# Patient Record
Sex: MALE | Race: WHITE | NOT HISPANIC OR LATINO | Employment: OTHER | URBAN - NONMETROPOLITAN AREA
[De-identification: names, ages, dates, MRNs, and addresses within clinical notes are randomized per-mention and may not be internally consistent; named-entity substitution may affect disease eponyms.]

---

## 2023-01-09 VITALS
DIASTOLIC BLOOD PRESSURE: 92 MMHG | SYSTOLIC BLOOD PRESSURE: 152 MMHG | WEIGHT: 200.94 LBS | HEART RATE: 66 BPM | HEIGHT: 71 IN | TEMPERATURE: 97 F | BODY MASS INDEX: 28.13 KG/M2 | RESPIRATION RATE: 16 BRPM

## 2023-01-09 RX ORDER — OXYBUTYNIN CHLORIDE 5 MG/1
5 TABLET ORAL 3 TIMES DAILY
COMMUNITY
End: 2023-12-28

## 2023-01-09 RX ORDER — FAMOTIDINE 20 MG/1
20 TABLET, FILM COATED ORAL 2 TIMES DAILY
COMMUNITY

## 2023-01-09 RX ORDER — CALC/MAG/B COMPLEX/D3/HERB 61
15 TABLET ORAL DAILY
COMMUNITY
End: 2023-07-17

## 2023-01-09 RX ORDER — IMIPRAMINE HYDROCHLORIDE 25 MG/1
25 TABLET, FILM COATED ORAL NIGHTLY
COMMUNITY

## 2023-01-09 RX ORDER — CLONAZEPAM 0.5 MG/1
0.5 TABLET ORAL 2 TIMES DAILY PRN
COMMUNITY
End: 2023-12-28

## 2023-03-02 ENCOUNTER — DOCUMENTATION ONLY (OUTPATIENT)
Dept: PEDIATRICS | Facility: CLINIC | Age: 73
End: 2023-03-02

## 2023-03-02 LAB
ALBUMIN/GLOB SERPL ELPH: 2.2 {RATIO}
ALBUMIN: 4.2
ALKALINE PHOS(POC): 36
ALT SERPL W P-5'-P-CCNC: 31 U/L (ref 10–40)
AST SERPL-CCNC: 27 U/L
BILIRUBIN, TOTAL: 0.8
BUN SERPL-MCNC: 13 MG/DL
BUN/CREAT RATIO: 10
CALCIUM SERPL-MCNC: 9.2 MG/DL
CHLORIDE: 106 MMOL/L
CHOLEST SERPL-MSCNC: 206 MG/DL (ref 0–200)
CO2 SERPL-SCNC: 28 MMOL/L
CREAT SERPL-MCNC: 1.3 MG/DL
DIRECT LDL: 122
ERYTHROCYTE [DISTWIDTH] IN BLOOD BY AUTOMATED COUNT: 12.4 %
GFR MDRD AF AMER: >=60
GFR MDRD NON AF AMER: 55
GLOBULIN: 1.9
GLUCOSE: 100
GRAN #: 3.8 K/UL
GRAN%: 58.2 %
HCT: 42.6
HDLC SERPL-MCNC: 55 MG/DL
HEMOGLOBIN: 14.7
LYMPH #: 2.2 K/UL
LYMPH %: 34.7
MCH RBC QN AUTO: 31.6 PG
MCHC RBC AUTO-ENTMCNC: 34.4 G/DL
MCV RBC AUTO: 91.7 FL
MID%: 7.1
MID: 0.5
MPC BLD CALC-MCNC: 8.9 FL
PLATELETS: 155
POTASSIUM: 4.5 MMOL/L
RBC # BLD AUTO: 4.64 10*6/UL
SODIUM: 143 MMOL/L
TOTAL PROTEIN: 6.1 G/DL (ref 6.4–8.2)
TRIGL SERPL-MCNC: 147 MG/DL
WBC: 6.5

## 2023-06-12 ENCOUNTER — TELEPHONE (OUTPATIENT)
Dept: FAMILY MEDICINE | Facility: CLINIC | Age: 73
End: 2023-06-12

## 2023-07-17 RX ORDER — PANTOPRAZOLE SODIUM 40 MG/1
TABLET, DELAYED RELEASE ORAL
Qty: 30 TABLET | Refills: 3 | Status: SHIPPED | OUTPATIENT
Start: 2023-07-17 | End: 2023-11-13

## 2023-11-06 ENCOUNTER — TELEPHONE (OUTPATIENT)
Dept: FAMILY MEDICINE | Facility: CLINIC | Age: 73
End: 2023-11-06
Payer: MEDICARE

## 2023-11-06 DIAGNOSIS — L60.0 INGROWN NAIL: Primary | ICD-10-CM

## 2023-11-06 RX ORDER — CLINDAMYCIN HYDROCHLORIDE 300 MG/1
300 CAPSULE ORAL 3 TIMES DAILY
Qty: 30 CAPSULE | Refills: 0 | Status: SHIPPED | OUTPATIENT
Start: 2023-11-06 | End: 2023-11-16

## 2023-11-06 NOTE — TELEPHONE ENCOUNTER
----- Message from DEYSI Fernandez sent at 11/6/2023  4:26 PM CST -----  Regarding: RE: nurse call  Please let him know meds sent to pharmacy.  ----- Message -----  From: Blank Johnson LPN  Sent: 11/6/2023  10:36 AM CST  To: DEYSI Fernandez  Subject: FW: nurse call                                     ----- Message -----  From: Pao Sanchez  Sent: 11/6/2023   9:20 AM CST  To: Flip Navarro Staff  Subject: nurse call                                       Pt called, states he has infection on finger that he had before and wants nurse to look at it bc we did this for him before and to call in Abx for him, I offered pt soonest available Wednesday, pt denied bc he wont be in town and he just wants nurse to look at it   [phone]  [place]

## 2023-11-13 RX ORDER — PANTOPRAZOLE SODIUM 40 MG/1
TABLET, DELAYED RELEASE ORAL
Qty: 30 TABLET | Refills: 3 | Status: SHIPPED | OUTPATIENT
Start: 2023-11-13 | End: 2024-03-12

## 2023-11-28 ENCOUNTER — DOCUMENTATION ONLY (OUTPATIENT)
Dept: PEDIATRICS | Facility: CLINIC | Age: 73
End: 2023-11-28
Payer: MEDICARE

## 2023-12-14 ENCOUNTER — TELEPHONE (OUTPATIENT)
Dept: FAMILY MEDICINE | Facility: CLINIC | Age: 73
End: 2023-12-14
Payer: MEDICARE

## 2023-12-14 DIAGNOSIS — Z00.00 WELLNESS EXAMINATION: ICD-10-CM

## 2023-12-14 NOTE — TELEPHONE ENCOUNTER
----- Message from Pao Sanchez sent at 12/13/2023 11:25 AM CST -----  Regarding: labs  Wife wants lab orders sent to EM before appt here on 12/28/23   [phone]

## 2023-12-20 ENCOUNTER — DOCUMENTATION ONLY (OUTPATIENT)
Dept: FAMILY MEDICINE | Facility: CLINIC | Age: 73
End: 2023-12-20
Payer: MEDICARE

## 2023-12-20 NOTE — PROGRESS NOTES
All labs within acceptable ranges for age and conditions. No change in treatment needed at this time. Keep appts as scheduled.

## 2023-12-28 ENCOUNTER — OFFICE VISIT (OUTPATIENT)
Dept: FAMILY MEDICINE | Facility: CLINIC | Age: 73
End: 2023-12-28
Payer: MEDICARE

## 2023-12-28 ENCOUNTER — DOCUMENTATION ONLY (OUTPATIENT)
Dept: FAMILY MEDICINE | Facility: CLINIC | Age: 73
End: 2023-12-28

## 2023-12-28 VITALS
HEART RATE: 60 BPM | RESPIRATION RATE: 16 BRPM | DIASTOLIC BLOOD PRESSURE: 80 MMHG | SYSTOLIC BLOOD PRESSURE: 128 MMHG | WEIGHT: 200.19 LBS | TEMPERATURE: 98 F | OXYGEN SATURATION: 99 % | BODY MASS INDEX: 28.66 KG/M2 | HEIGHT: 70 IN

## 2023-12-28 DIAGNOSIS — R73.01 IMPAIRED FASTING GLUCOSE: ICD-10-CM

## 2023-12-28 DIAGNOSIS — Z23 IMMUNIZATION DUE: ICD-10-CM

## 2023-12-28 DIAGNOSIS — I10 HYPERTENSION, UNSPECIFIED TYPE: ICD-10-CM

## 2023-12-28 DIAGNOSIS — E78.5 HYPERLIPIDEMIA, UNSPECIFIED HYPERLIPIDEMIA TYPE: ICD-10-CM

## 2023-12-28 DIAGNOSIS — Z00.00 WELLNESS EXAMINATION: Primary | ICD-10-CM

## 2023-12-28 LAB
ALBUMIN/GLOB SERPL ELPH: 2.5 {RATIO}
ALBUMIN: 4.3
ALKALINE PHOS(POC): 34
ALT: 26
AST: 25
BILIRUBIN, TOTAL: 0.7
BUN SERPL-MCNC: 16 MG/DL
BUN/CREAT RATIO: 13.3
CALCIUM SERPL-MCNC: 9.4 MG/DL
CHLORIDE: 104 MMOL/L
CHOLEST SERPL-MSCNC: 208 MG/DL (ref 0–200)
CO2 SERPL-SCNC: 30 MMOL/L
CREAT SERPL-MCNC: 1.2 MG/DL
ERYTHROCYTE [DISTWIDTH] IN BLOOD BY AUTOMATED COUNT: 12.6 %
GFR MDRD AF AMER: >=60
GFR MDRD NON AF AMER: 60
GLOBULIN: 1.7
GLUCOSE BLOOD: 101
GRAN #: 3.4 K/UL
GRAN%: 65.6 %
HBA1C MFR BLD: 5.4 %
HCT: 41
HDLC SERPL-MCNC: 60 MG/DL
HGB: 14
LDLC SERPL CALC-MCNC: 128 MG/DL
LYMPH #: 1.4 K/UL
LYMPH %: 28.1
MCH RBC QN AUTO: 31.8 PG
MCHC RBC AUTO-ENTMCNC: 34.2 G/DL
MCV RBC AUTO: 93.1 FL
MID%: 6.3
MID: 0.4
MPC BLD CALC-MCNC: 8.9 FL
PLATELETS: 155
POTASSIUM: 4.8 MMOL/L
RBC # BLD AUTO: 4.41 10*6/UL
SODIUM: 141 MMOL/L
TOTAL PROTEIN: 6 G/DL (ref 6.4–8.2)
TRIGL SERPL-MCNC: 102 MG/DL
WBC: 5.2

## 2023-12-28 PROCEDURE — G0009 PNEUMOCOCCAL CONJUGATE VACCINE 20-VALENT: ICD-10-PCS | Mod: ,,, | Performed by: NURSE PRACTITIONER

## 2023-12-28 PROCEDURE — 90694 FLU VACCINE - QUADRIVALENT - ADJUVANTED: ICD-10-PCS | Mod: ,,, | Performed by: NURSE PRACTITIONER

## 2023-12-28 PROCEDURE — 90694 VACC AIIV4 NO PRSRV 0.5ML IM: CPT | Mod: ,,, | Performed by: NURSE PRACTITIONER

## 2023-12-28 PROCEDURE — G0009 ADMIN PNEUMOCOCCAL VACCINE: HCPCS | Mod: ,,, | Performed by: NURSE PRACTITIONER

## 2023-12-28 PROCEDURE — 80061 LIPID PANEL: CPT | Mod: QW,,, | Performed by: NURSE PRACTITIONER

## 2023-12-28 PROCEDURE — 90677 PCV20 VACCINE IM: CPT | Mod: ,,, | Performed by: NURSE PRACTITIONER

## 2023-12-28 PROCEDURE — G0008 FLU VACCINE - QUADRIVALENT - ADJUVANTED: ICD-10-PCS | Mod: ,,, | Performed by: NURSE PRACTITIONER

## 2023-12-28 PROCEDURE — 90677 PNEUMOCOCCAL CONJUGATE VACCINE 20-VALENT: ICD-10-PCS | Mod: ,,, | Performed by: NURSE PRACTITIONER

## 2023-12-28 PROCEDURE — 80061 LIPID PANEL: ICD-10-PCS | Mod: QW,,, | Performed by: NURSE PRACTITIONER

## 2023-12-28 PROCEDURE — G0439 PR MEDICARE ANNUAL WELLNESS SUBSEQUENT VISIT: ICD-10-PCS | Mod: ,,, | Performed by: NURSE PRACTITIONER

## 2023-12-28 PROCEDURE — 80053 COMPREHENSIVE METABOLIC PANEL: ICD-10-PCS | Mod: QW,,, | Performed by: NURSE PRACTITIONER

## 2023-12-28 PROCEDURE — G0439 PPPS, SUBSEQ VISIT: HCPCS | Mod: ,,, | Performed by: NURSE PRACTITIONER

## 2023-12-28 PROCEDURE — 83036 HEMOGLOBIN A1C: ICD-10-PCS | Mod: QW,,, | Performed by: NURSE PRACTITIONER

## 2023-12-28 PROCEDURE — 83036 HEMOGLOBIN GLYCOSYLATED A1C: CPT | Mod: QW,,, | Performed by: NURSE PRACTITIONER

## 2023-12-28 PROCEDURE — 80053 COMPREHEN METABOLIC PANEL: CPT | Mod: QW,,, | Performed by: NURSE PRACTITIONER

## 2023-12-28 PROCEDURE — G0008 ADMIN INFLUENZA VIRUS VAC: HCPCS | Mod: ,,, | Performed by: NURSE PRACTITIONER

## 2023-12-28 RX ORDER — TAMSULOSIN HYDROCHLORIDE 0.4 MG/1
1 CAPSULE ORAL DAILY
COMMUNITY
Start: 2023-12-16

## 2023-12-28 NOTE — PROGRESS NOTES
Patient ID: Reilly Flores  : 1950    Chief Complaint: Annual Exam (Pt here today for wellness.)      History of Present Illness:  The patient is a 73 y.o. White male who presents to clinic for annual wellness visit.    Reviewed and discussed labs. Overall he feels well, no concerns today.     Allergies: Patient is allergic to sulfa (sulfonamide antibiotics).     Past Medical History:  has a past medical history of Anxiety and GERD (gastroesophageal reflux disease).    Surgical History:  has a past surgical history that includes back procedure (N/A); Cataract extraction (N/A); Cyst Removal (N/A); and teeth implants (N/A).    Family History: family history includes Anxiety disorder in his mother; Heart disease in his father.    Social History:  reports that he has never smoked. He has never been exposed to tobacco smoke. He has never used smokeless tobacco. He reports that he does not currently use alcohol. He reports that he does not use drugs.    Care Team: Patient Care Team:  Melanie Castelan APRN as PCP - General (Family Medicine)     Current Medications:  Current Outpatient Medications   Medication Instructions    famotidine (PEPCID) 20 mg, Oral, 2 times daily    imipramine (TOFRANIL) 25 mg, Oral, Nightly    pantoprazole (PROTONIX) 40 MG tablet TAKE 1 TABLET BY MOUTH DAILY    tamsulosin (FLOMAX) 0.4 mg Cap 1 capsule, Oral, Daily       Opioid Screening: Patient medication list reviewed, patient is not taking prescription opioids. Patient is not using additional opioids than prescribed. Patient is at low risk of substance abuse based on this opioid use history.     Patient Reported Health Risk Assessment  What [age] your [age] 70-79  Are you male or female?: Male  During the past four weeks, how much have you been bothered by emotional problems such as feeling anxious, depressed, irritable, sad, or downhearted and blue?: Not at all  During the past five weeks, has your physical and/or emotional health  limited your social activities with family, friends, neighbors, or groups?: Not at all  During the past four weeks, how much bodily pain have you generally had?: No pain  During the past four weeks, was someone available to help if you needed and wanted help?: Yes, as much as I wanted  During the past four weeks, what was the hardest physical activity you could do for at least two minutes?: Heavy  Can you get to places out of walking distance without help?  (For example, can you travel alone on buses or taxis, or drive your own car?): Yes  Can you go shopping for groceries or clothes without someone's help?: Yes  Can you prepare your own meals?: Yes  Can you do your own housework without help?: Yes  Because of any health problems, do you need the help of another person with your personal care needs such as eating, bathing, dressing, or getting around the house?: No  Can you handle your own money without help?: Yes  During the past four weeks, how would you rate your health in general?: Very good  How have things been going for you during the past four weeks?: Very well  Are you having difficulties driving your car?: No  Do you always fasten your seat belt when you are in a car?: Yes, usually  How often in the past four weeks have you been bothered by falling or dizzy when standing up?: Never  How often in the past four weeks have you been bothered by sexual problems?: Sometimes  How often in the past four weeks have you been bothered by trouble eating well?: Never  How often in the past four weeks have you been bothered by teeth or denture problems?: Never  How often in the past four weeks have you been bothered with problems using the telephone?: Never  How often in the past four weeks have you been bothered by tiredness or fatigue?: Never  Have you fallen two or more times in the past year?: No  Are you afraid of falling?: No  Are you a smoker?: No  During the past four weeks, how many drinks of wine, beer, or  "other alcoholic beverages did you have?: No alcohol at all  Do you exercise for about 20 minutes three or more days a week?: Yes, most of the time  Have you been given any information to help you with hazards in your house that might hurt you?: No  Have you been given any information to help you with keeping track of your medications?: No  How often do you have trouble taking medicines the way you've been told to take them?: I always take them as prescribed  How confident are you that you can control and manage most of your health problems?: Very confident  What is your race? (Check all that apply.):     Review of Systems     Twelve point review of system conducted, negative except as stated in the history of present illness.  See HPI for details.    Visit Vitals  /80 (BP Location: Left arm)   Pulse 60   Temp 97.8 °F (36.6 °C) (Oral)   Resp 16   Ht 5' 10" (1.778 m)   Wt 90.8 kg (200 lb 3.2 oz)   SpO2 99%   BMI 28.73 kg/m²       Physical Exam  Constitutional:       Appearance: Normal appearance. He is normal weight.   HENT:      Head: Normocephalic.      Right Ear: Tympanic membrane, ear canal and external ear normal.      Left Ear: Tympanic membrane, ear canal and external ear normal.      Nose: Nose normal.      Mouth/Throat:      Mouth: Mucous membranes are moist.      Pharynx: Oropharynx is clear.   Eyes:      Extraocular Movements: Extraocular movements intact.      Conjunctiva/sclera: Conjunctivae normal.      Pupils: Pupils are equal, round, and reactive to light.   Cardiovascular:      Rate and Rhythm: Normal rate and regular rhythm.      Pulses: Normal pulses.      Heart sounds: Normal heart sounds.   Pulmonary:      Effort: Pulmonary effort is normal.      Breath sounds: Normal breath sounds.   Abdominal:      General: Abdomen is flat. Bowel sounds are normal.      Palpations: Abdomen is soft.   Musculoskeletal:      Cervical back: Neck supple.   Skin:     General: Skin is warm and dry. "   Neurological:      General: No focal deficit present.      Mental Status: He is alert and oriented to person, place, and time.   Psychiatric:         Behavior: Behavior normal.               No data to display                  12/28/2023     9:30 AM   Fall Risk Assessment - Outpatient   Mobility Status Ambulatory   Number of falls 0   Identified as fall risk False           Depression Screening  Over the past two weeks, has the patient felt down, depressed, or hopeless?: No  Over the past two weeks, has the patient felt little interest or pleasure in doing things?: No  Functional Ability/Safety Screening  Was the patient's timed Up & Go test unsteady or longer than 30 seconds?: No  Does the patient need help with phone, transportation, shopping, preparing meals, housework, laundry, meds, or managing money?: No  Does the patient's home have rugs in the hallway, lack grab bars in the bathroom, lack handrails on the stairs or have poor lighting?: No  Have you noticed any hearing difficulties?: Yes  Cognitive Function (Assessed through direct observation with due consideration of information obtained by way of patient reports and/or concerns raised by family, friends, caretakers, or others)    Does the patient repeat questions/statements in the same day?: No  Does the patient have trouble remembering the date, year, and time?: No  Does the patient have difficulty managing finances?: No  Does the patient have a decreased sense of direction?: No    Labs Reviewed:  Chemistry:  Lab Results   Component Value Date     12/20/2023    K 4.8 12/20/2023    BUN 16 12/20/2023    CREATININE 1.2 12/20/2023    GLUCOSE 101 12/20/2023    CALCIUM 9.4 12/20/2023    ALBUMIN 4.2 02/01/2022    AST 27 02/01/2022    ALT 26 12/20/2023        Lab Results   Component Value Date    HGBA1C 5.4 12/20/2023        Hematology:  Lab Results   Component Value Date    WBC 5.2 12/20/2023    HGB 14.0 12/20/2023    MCV 93.1 12/20/2023    MPV 8.9  12/20/2023    GRAN 3.4 12/20/2023    GRAN 65.6 12/20/2023       Lipid Panel:  Lab Results   Component Value Date    CHOL 208 (A) 12/20/2023    HDL 60 12/20/2023    DLDL 122 02/01/2022    TRIG 102 12/20/2023        Assessment & Plan:  1. Wellness examination   Age appropriate preventative screenings reviewed.  -     (In Office Administered) Pneumococcal Conjugate Vaccine (20 Valent) (IM) (Preferred)  -     Influenza (FLUAD) - Quadrivalent (Adjuvanted) *Preferred* (65+) (PF)      Colon Cancer Screening -  2/3/22- Cologuard screen negative  Eye Exam- Recommend annually.  Dental Exam- Recommend biannually.    Vaccinations:  Immunization History   Administered Date(s) Administered    COVID-19 Vaccine 02/11/2021, 03/11/2021, 12/16/2021    Influenza 01/21/2019, 10/02/2019    Influenza (FLUAD) - Quadrivalent - Adjuvanted - PF *Preferred* (65+) 12/28/2023    Influenza - Quadrivalent 12/14/2022    Influenza - Quadrivalent - MDCK - PF 01/21/2019    Influenza - Quadrivalent - PF *Preferred* (6 months and older) 10/02/2019    Pneumococcal Conjugate - 20 Valent 12/28/2023       Future Appointments   Date Time Provider Department Center   12/26/2024 10:00 AM Melanie Castelan, APRN [facility] [place] [employer] Annual Wellness and Personalized Prevention Plan:   Fall Risk + Home Safety + Hearing Impairment + Depression Screen + Opioid and Substance Abuse Screening + Cognitive Impairment Screen + Health Risk Assessment all reviewed.     Health Maintenance Topics with due status: Not Due       Topic Last Completion Date    Colorectal Cancer Screening 02/03/2022    Hemoglobin A1c (Prediabetes) 12/20/2023    Lipid Panel 12/20/2023            Advance Care Planning   I attest to discussing Advance Care Planning with patient and/or family member.  Education was provided including the importance of the Health Care Power of , Advance Directives, and/or [place] documentation.  The patient expressed understanding to the  importance of this information and discussion.       Follow up in about 1 year (around 12/28/2024) for Wellness. In addition to their scheduled follow up, the patient has also been instructed to follow up on as needed basis.         Melanie Castelan, APRN

## 2024-02-19 ENCOUNTER — OFFICE VISIT (OUTPATIENT)
Dept: FAMILY MEDICINE | Facility: CLINIC | Age: 74
End: 2024-02-19
Payer: MEDICARE

## 2024-02-19 VITALS
BODY MASS INDEX: 29.03 KG/M2 | DIASTOLIC BLOOD PRESSURE: 80 MMHG | WEIGHT: 202.81 LBS | SYSTOLIC BLOOD PRESSURE: 144 MMHG | RESPIRATION RATE: 16 BRPM | TEMPERATURE: 98 F | HEART RATE: 74 BPM | HEIGHT: 70 IN | OXYGEN SATURATION: 99 %

## 2024-02-19 DIAGNOSIS — H61.21 IMPACTED CERUMEN OF RIGHT EAR: Primary | ICD-10-CM

## 2024-02-19 PROCEDURE — 69210 REMOVE IMPACTED EAR WAX UNI: CPT | Mod: ,,, | Performed by: NURSE PRACTITIONER

## 2024-02-19 PROCEDURE — 99214 OFFICE O/P EST MOD 30 MIN: CPT | Mod: ,,, | Performed by: NURSE PRACTITIONER

## 2024-02-19 RX ORDER — OFLOXACIN 3 MG/ML
5 SOLUTION AURICULAR (OTIC) 2 TIMES DAILY
Qty: 5 ML | Refills: 0 | Status: SHIPPED | OUTPATIENT
Start: 2024-02-19 | End: 2024-02-22

## 2024-02-19 NOTE — PROGRESS NOTES
Subjective:       Patient ID: Reilly Flores is a 73 y.o. male.    Chief Complaint: ear congestion (Pt here today c/o ear congestion and would like ear cleaned. )      HPI  Patient reports using debrox at home for about one week without improvement.    Review of Systems      Twelve point review of system conducted, negative except as stated in the history of present illness.  See HPI for details.      Objective:      Vitals:    02/19/24 1436   BP: (!) 144/80   Pulse: 74   Resp: 16   Temp: 97.8 °F (36.6 °C)     Review of patient's allergies indicates:   Allergen Reactions    Sulfa (sulfonamide antibiotics)      Past Medical History:   Diagnosis Date    Anxiety     GERD (gastroesophageal reflux disease)        Physical Exam  Vitals and nursing note reviewed.   Constitutional:       Appearance: Normal appearance.   HENT:      Head: Normocephalic.      Right Ear: There is impacted cerumen.      Left Ear: Tympanic membrane normal.   Eyes:      Conjunctiva/sclera: Conjunctivae normal.      Pupils: Pupils are equal, round, and reactive to light.   Cardiovascular:      Rate and Rhythm: Normal rate and regular rhythm.   Pulmonary:      Effort: Pulmonary effort is normal.      Breath sounds: Normal breath sounds.   Musculoskeletal:         General: Normal range of motion.      Cervical back: Normal range of motion and neck supple.   Skin:     General: Skin is warm and dry.   Neurological:      General: No focal deficit present.      Mental Status: He is alert and oriented to person, place, and time.   Psychiatric:         Mood and Affect: Mood normal.         Behavior: Behavior normal.            Assessment/Plan:       1. Impacted cerumen of right ear  -     ofloxacin (FLOXIN) 0.3 % otic solution; Place 5 drops into both ears 2 (two) times daily. for 3 days  Dispense: 5 mL; Refill: 0  -     Ear Cerumen Removal   See quick procedure note.         Follow-up:       Follow up if symptoms worsen or fail to improve.                      [facility]  [place]

## 2024-03-12 RX ORDER — PANTOPRAZOLE SODIUM 40 MG/1
TABLET, DELAYED RELEASE ORAL
Qty: 30 TABLET | Refills: 3 | Status: SHIPPED | OUTPATIENT
Start: 2024-03-12

## 2024-05-29 ENCOUNTER — DOCUMENTATION ONLY (OUTPATIENT)
Dept: FAMILY MEDICINE | Facility: CLINIC | Age: 74
End: 2024-05-29
Payer: MEDICARE

## 2024-08-05 RX ORDER — PANTOPRAZOLE SODIUM 40 MG/1
40 TABLET, DELAYED RELEASE ORAL
Qty: 30 TABLET | Refills: 5 | Status: SHIPPED | OUTPATIENT
Start: 2024-08-05

## 2024-11-01 ENCOUNTER — DOCUMENTATION ONLY (OUTPATIENT)
Dept: FAMILY MEDICINE | Facility: CLINIC | Age: 74
End: 2024-11-01
Payer: MEDICARE

## 2024-11-01 LAB — CRC RECOMMENDATION EXT: NORMAL

## 2024-11-07 ENCOUNTER — DOCUMENTATION ONLY (OUTPATIENT)
Dept: FAMILY MEDICINE | Facility: CLINIC | Age: 74
End: 2024-11-07
Payer: MEDICARE

## 2024-11-07 LAB — CRC RECOMMENDATION EXT: NORMAL

## 2025-01-07 ENCOUNTER — OFFICE VISIT (OUTPATIENT)
Dept: FAMILY MEDICINE | Facility: CLINIC | Age: 75
End: 2025-01-07
Payer: MEDICARE

## 2025-01-07 VITALS
BODY MASS INDEX: 28.79 KG/M2 | HEART RATE: 66 BPM | HEIGHT: 70 IN | WEIGHT: 201.13 LBS | DIASTOLIC BLOOD PRESSURE: 88 MMHG | OXYGEN SATURATION: 99 % | SYSTOLIC BLOOD PRESSURE: 130 MMHG | TEMPERATURE: 98 F

## 2025-01-07 DIAGNOSIS — Z12.5 SCREENING FOR PROSTATE CANCER: ICD-10-CM

## 2025-01-07 DIAGNOSIS — Z00.00 ENCOUNTER FOR PREVENTIVE HEALTH EXAMINATION: Primary | ICD-10-CM

## 2025-01-07 DIAGNOSIS — F41.9 ANXIETY: ICD-10-CM

## 2025-01-07 DIAGNOSIS — R73.01 IFG (IMPAIRED FASTING GLUCOSE): ICD-10-CM

## 2025-01-07 DIAGNOSIS — Z23 FLU VACCINE NEED: ICD-10-CM

## 2025-01-07 DIAGNOSIS — Z13.220 LIPID SCREENING: ICD-10-CM

## 2025-01-07 PROCEDURE — G0008 ADMIN INFLUENZA VIRUS VAC: HCPCS | Mod: ,,, | Performed by: NURSE PRACTITIONER

## 2025-01-07 PROCEDURE — 90653 IIV ADJUVANT VACCINE IM: CPT | Mod: ,,, | Performed by: NURSE PRACTITIONER

## 2025-01-07 PROCEDURE — G0439 PPPS, SUBSEQ VISIT: HCPCS | Mod: ,,, | Performed by: NURSE PRACTITIONER

## 2025-01-07 RX ORDER — ACETAMINOPHEN 500 MG
1000 TABLET ORAL NIGHTLY
COMMUNITY

## 2025-01-07 RX ORDER — SIMETHICONE 80 MG
80 TABLET,CHEWABLE ORAL NIGHTLY
COMMUNITY

## 2025-01-07 RX ORDER — CHOLECALCIFEROL (VITAMIN D3) 125 MCG
CAPSULE ORAL
COMMUNITY

## 2025-01-07 RX ORDER — MAGNESIUM HYDROXIDE 400 MG/5ML
SUSPENSION, ORAL (FINAL DOSE FORM) ORAL NIGHTLY
COMMUNITY

## 2025-01-07 RX ORDER — SENNOSIDES 8.6 MG/1
TABLET ORAL
COMMUNITY

## 2025-01-07 RX ORDER — SPIRONOLACTONE 25 MG
20 TABLET ORAL NIGHTLY
COMMUNITY

## 2025-01-07 RX ORDER — LANOLIN ALCOHOL/MO/W.PET/CERES
1 CREAM (GRAM) TOPICAL NIGHTLY
COMMUNITY

## 2025-01-07 NOTE — PATIENT INSTRUCTIONS
Medicare Annual Wellness Visit      Patient Name: Reilly Flores  Today's Date: 1/7/2025    Below you will find your 5-10 year Screening Plan Recommendations:  Health Maintenance       Date Due Completion Date    Hepatitis C Screening Never done ---    TETANUS VACCINE Never done ---    Shingles Vaccine (1 of 2) Never done ---    COVID-19 Vaccine (4 - 2024-25 season) 09/01/2024 12/16/2021    RSV Vaccine (Age 60+ and Pregnant patients) (1 - 1-dose 75+ series) 05/05/2026 ---    Colorectal Cancer Screening 11/01/2027 11/1/2024    Lipid Panel 05/31/2029 5/31/2024          Below is your summarized Personalized Prevention Plan that addresses any concerns we discussed today at your visit. Please see attached detailed information specific to your Health Concerns.  Orders Placed This Encounter   Procedures    CBC Auto Differential    Comprehensive Metabolic Panel    Lipid Panel    Hemoglobin A1C    PSA, Screening           The following information is provided to all patients.  This information is to help you find additional resources for any problems that may be affecting your health: Living healthy guide: www.[facility].[place].gov      Understanding Diabetes: www.diabetes.org      Eating healthy: www.cdc.gov/healthyweight      CDC home safety checklist: www.cdc.gov/steadi/patient.html      Agency on Aging: www.goea.[place].gov      Alcoholics anonymous (AA): www.aa.org      Physical Activity: www.rudy.nih.gov/dq3pemx      Tobacco use: www.quitwithusla.org

## 2025-01-07 NOTE — PROGRESS NOTES
Patient ID: Reilly Flores  : 1950    Chief Complaint: Medicare AWV      History of Present Illness:  The patient is a 74 y.o. White male who presents to clinic for annual wellness visit.        Allergies: Patient is allergic to sulfa (sulfonamide antibiotics).     Past Medical History:  has a past medical history of Anxiety and GERD (gastroesophageal reflux disease).    Surgical History:  has a past surgical history that includes back procedure (N/A); Cataract extraction (N/A); Cyst Removal (N/A); and teeth implants (N/A).    Family History: family history includes Anxiety disorder in his mother; Heart disease in his father.    Social History:  reports that he has never smoked. He has never been exposed to tobacco smoke. He has never used smokeless tobacco. He reports that he does not currently use alcohol. He reports that he does not use drugs.    Care Team: Patient Care Team:  Melanie Castelan APRN as PCP - General (Family Medicine)     Current Medications:  Current Outpatient Medications   Medication Instructions    acetaminophen (TYLENOL EXTRA STRENGTH) 1,000 mg, Nightly    calcium carbonate/vitamin D3 (VITAMIN D-3 ORAL) 125 mcg, 2 times daily    coQ10 (ubiquinol) 200 mg, Daily    famotidine (PEPCID) 20 mg, 2 times daily    ferrous sulfate (FEOSOL) Tab tablet 1 tablet, Nightly    fish oil/borage/flax/om3,6,9 1 (OMEGA 3-6-9 ORAL) 1,400 mg, Daily    imipramine (TOFRANIL) 25 mg, Nightly    Lactobac no.41/Bifidobact no.7 (PROBIOTIC-10 ORAL) 220 mg, 2 times daily    lutein 20 mg, Nightly    multivitamin-Ca-iron-minerals 27-0.4 mg Tab 2 times daily    naproxen sodium (ALEVE) 220 mg Cap Take 2 capsules every day by oral route.    pantoprazole (PROTONIX) 40 mg, Oral    potassium gluconate 595 mg (99 mg) Tab Nightly    PSYLLIUM HUSK, ASPARTAME, ORAL 1,000 mg, 2 times daily    senna (SENOKOT) 8.6 mg tablet Take 1 tablet every day by oral route in the evening.    simethicone (MYLICON) 80 mg, Nightly     tamsulosin (FLOMAX) 0.4 mg Cap 1 capsule, Daily       Opioid Screening: Patient medication list reviewed, patient is not taking prescription opioids. Patient is not using additional opioids than prescribed. Patient is at low risk of substance abuse based on this opioid use history.     Patient Reported Health Risk Assessment  What [age] 70-79  Are you male or female?: Male  During the past four weeks, how much have you been bothered by emotional problems such as feeling anxious, depressed, irritable, sad, or downhearted and blue?: Not at all  During the past five weeks, has your physical and/or emotional health limited your social activities with family, friends, neighbors, or groups?: Not at all  During the past four weeks, how much bodily pain have you generally had?: Very mild pain  During the past four weeks, was someone available to help if you needed and wanted help?: Yes, as much as I wanted  During the past four weeks, what was the hardest physical activity you could do for at least two minutes?: Heavy  Can you get to places out of walking distance without help?  (For example, can you travel alone on buses or taxis, or drive your own car?): Yes  Can you go shopping for groceries or clothes without someone's help?: Yes  Can you prepare your own meals?: Yes  Can you do your own housework without help?: Yes  Because of any health problems, do you need the help of another person with your personal care needs such as eating, bathing, dressing, or getting around the house?: No  Can you handle your own money without help?: Yes  During the past four weeks, how would you rate your health in general?: Excellent  How have things been going for you during the past four weeks?: Very well  Are you having difficulties driving your car?: No  Do you always fasten your seat belt when you are in a car?: Yes, usually  How often in the past four weeks have you been bothered by falling or dizzy when standing up?:  "Never  How often in the past four weeks have you been bothered by sexual problems?: Seldom  How often in the past four weeks have you been bothered by trouble eating well?: Never  How often in the past four weeks have you been bothered by teeth or denture problems?: Never  How often in the past four weeks have you been bothered with problems using the telephone?: Never  How often in the past four weeks have you been bothered by tiredness or fatigue?: Never  Have you fallen two or more times in the past year?: No  Are you afraid of falling?: No  Are you a smoker?: No  During the past four weeks, how many drinks of wine, beer, or other alcoholic beverages did you have?: No alcohol at all  Do you exercise for about 20 minutes three or more days a week?: Yes, most of the time  Have you been given any information to help you with hazards in your house that might hurt you?: No  Have you been given any information to help you with keeping track of your medications?: No  How often do you have trouble taking medicines the way you've been told to take them?: I always take them as prescribed  How confident are you that you can control and manage most of your health problems?: Very confident  What is your race? (Check all that apply.):     Review of Systems   Twelve point review of system conducted, negative except as stated in the history of present illness.  See HPI for details.    Visit Vitals  /88 (BP Location: Right arm, Patient Position: Sitting)   Pulse 66   Temp 97.7 °F (36.5 °C) (Oral)   Ht 5' 10" (1.778 m)   Wt 91.2 kg (201 lb 2 oz)   SpO2 99%   BMI 28.86 kg/m²       Physical Exam  Constitutional:       Appearance: Normal appearance. He is normal weight.   HENT:      Head: Normocephalic.      Right Ear: External ear normal.      Left Ear: External ear normal.      Nose: Nose normal.      Mouth/Throat:      Mouth: Mucous membranes are moist.      Pharynx: Oropharynx is clear.   Eyes:      Extraocular " Movements: Extraocular movements intact.      Conjunctiva/sclera: Conjunctivae normal.      Pupils: Pupils are equal, round, and reactive to light.   Cardiovascular:      Rate and Rhythm: Normal rate and regular rhythm.      Heart sounds: Normal heart sounds.   Pulmonary:      Effort: Pulmonary effort is normal.      Breath sounds: Normal breath sounds.   Abdominal:      General: Abdomen is flat. Bowel sounds are normal.      Palpations: Abdomen is soft.   Musculoskeletal:         General: Normal range of motion.      Cervical back: Normal range of motion and neck supple.   Skin:     General: Skin is warm and dry.   Neurological:      General: No focal deficit present.      Mental Status: He is alert and oriented to person, place, and time.   Psychiatric:         Mood and Affect: Mood normal.         Behavior: Behavior normal.               No data to display                  1/7/2025    10:00 AM 2/19/2024     2:30 PM 12/28/2023     9:30 AM   Fall Risk Assessment - Outpatient   Mobility Status Ambulatory Ambulatory Ambulatory   Number of falls 0 0 0   Identified as fall risk False False False                Labs Reviewed:  Chemistry:  Lab Results   Component Value Date     12/20/2023    K 4.8 12/20/2023    BUN 16 12/20/2023    CREATININE 1.2 12/20/2023    GLUCOSE 101 12/20/2023    CALCIUM 9.4 12/20/2023    ALBUMIN 4.2 02/01/2022    AST 27 02/01/2022    ALT 26 12/20/2023        Lab Results   Component Value Date    HGBA1C 5.4 12/20/2023        Hematology:  Lab Results   Component Value Date    WBC 5.2 12/20/2023    HGB 14.0 12/20/2023    MCV 93.1 12/20/2023    MPV 8.9 12/20/2023    GRAN 3.4 12/20/2023    GRAN 65.6 12/20/2023       Lipid Panel:  Lab Results   Component Value Date    CHOL 208 (A) 12/20/2023    HDL 60 12/20/2023    DLDL 122 02/01/2022    TRIG 102 12/20/2023        Assessment & Plan:  1. Encounter for preventive health examination  Age appropriate preventative screenings reviewed.    -     CBC Auto  Differential; Future; Expected date: 01/07/2025  -     Comprehensive Metabolic Panel; Future; Expected date: 01/07/2025  -     Hemoglobin A1C; Future; Expected date: 01/07/2025    2. Flu vaccine need  -     influenza (adjuvanted) (Fluad) 45 mcg/0.5 mL IM vaccine (> or = 64 yo) 0.5 mL    3. Anxiety   Managed with Tofranil 25 mg nightly; followed by Psychiatry  -     CBC Auto Differential; Future; Expected date: 01/07/2025    4. IFG (impaired fasting glucose)  -     Hemoglobin A1C; Future; Expected date: 01/07/2025    5. Screening for prostate cancer  -     PSA, Screening; Future; Expected date: 01/07/2025    6. Lipid screening  -     Lipid Panel; Future; Expected date: 01/07/2025         Vaccinations:  Immunization History   Administered Date(s) Administered    COVID-19 Vaccine 02/11/2021, 03/11/2021, 12/16/2021    Influenza 01/21/2019, 10/02/2019    Influenza (FLUAD) - Quadrivalent - Adjuvanted - PF *Preferred* (65+) 12/28/2023    Influenza - Quadrivalent 12/14/2022    Influenza - Quadrivalent - MDCK - PF 01/21/2019    Influenza - Quadrivalent - PF *Preferred* (6 months and older) 10/02/2019    Influenza - Trivalent - Fluad - Adjuvanted - PF (65 years and older 01/07/2025    Pneumococcal Conjugate - 20 Valent 12/28/2023       Future Appointments   Date Time Provider Department Center   1/15/2025  8:10 AM LAB, [facility] LABORATORY DRAW [facility]ATION [facility] [place]       Follow up in about 1 year (around 1/7/2026), or if symptoms worsen or fail to improve, for Medicare AWV. Call sooner if needed.    DEYSI Vazquez    Lab Frequency Next Occurrence   CBC Auto Differential Once 01/07/2025   Comprehensive Metabolic Panel Once 01/07/2025   Lipid Panel Once 01/07/2025   Hemoglobin A1C Once 01/07/2025   PSA, Screening Once 01/07/2025        Medicare Annual Wellness and Personalized Prevention Plan:   Fall Risk + Home Safety + Hearing Impairment + Depression Screen + Opioid and Substance Abuse Screening + Cognitive Impairment  Screen + Health Risk Assessment all reviewed.     Health Maintenance Topics with due status: Not Due       Topic Last Completion Date    Lipid Panel 05/31/2024    Colorectal Cancer Screening 11/01/2024    RSV Vaccine (Age 60+ and Pregnant patients) Not Due        Advance Care Planning   I attest to discussing Advance Care Planning with patient and/or family member.  Education was provided including the importance of the Health Care Power of , Advance Directives, and/or [place] documentation.  The patient expressed understanding to the importance of this information and discussion.       Follow up in about 1 year (around 1/7/2026), or if symptoms worsen or fail to improve, for Medicare AWV. In addition to their scheduled follow up, the patient has also been instructed to follow up on as needed basis.

## 2025-01-15 PROCEDURE — 80053 COMPREHEN METABOLIC PANEL: CPT | Performed by: NURSE PRACTITIONER

## 2025-01-15 PROCEDURE — 84153 ASSAY OF PSA TOTAL: CPT | Performed by: NURSE PRACTITIONER

## 2025-01-15 PROCEDURE — 83036 HEMOGLOBIN GLYCOSYLATED A1C: CPT | Performed by: NURSE PRACTITIONER

## 2025-01-15 PROCEDURE — 85025 COMPLETE CBC W/AUTO DIFF WBC: CPT | Performed by: NURSE PRACTITIONER

## 2025-01-15 PROCEDURE — 80061 LIPID PANEL: CPT | Performed by: NURSE PRACTITIONER

## 2025-02-05 RX ORDER — PANTOPRAZOLE SODIUM 40 MG/1
40 TABLET, DELAYED RELEASE ORAL
Qty: 30 TABLET | Refills: 5 | Status: SHIPPED | OUTPATIENT
Start: 2025-02-05

## 2025-07-01 ENCOUNTER — OFFICE VISIT (OUTPATIENT)
Dept: FAMILY MEDICINE | Facility: CLINIC | Age: 75
End: 2025-07-01
Payer: MEDICARE

## 2025-07-01 VITALS
BODY MASS INDEX: 28.26 KG/M2 | SYSTOLIC BLOOD PRESSURE: 116 MMHG | HEIGHT: 70 IN | DIASTOLIC BLOOD PRESSURE: 66 MMHG | HEART RATE: 75 BPM | TEMPERATURE: 98 F | OXYGEN SATURATION: 99 % | WEIGHT: 197.38 LBS

## 2025-07-01 DIAGNOSIS — B35.1 TINEA UNGUIUM: Primary | ICD-10-CM

## 2025-07-01 PROCEDURE — 99214 OFFICE O/P EST MOD 30 MIN: CPT | Mod: ,,, | Performed by: NURSE PRACTITIONER

## 2025-07-01 NOTE — PROGRESS NOTES
Patient ID: [id number]     Chief Complaint: Nail Problem      HPI:     Reilly Flores is a 75 y.o. male here today for bilateral big toenail problems. Patient reports that he is always stubbing his toes on furniture or any other hard objects, causing the nail to become dead and fall off.  No other complaints today.       Past Medical History:  has a past medical history of Anxiety and GERD (gastroesophageal reflux disease).    Surgical History:  has a past surgical history that includes back procedure (N/A); Cataract extraction (N/A); Cyst Removal (N/A); and teeth implants (N/A).    Family History: family history includes Anxiety disorder in his mother; Heart disease in his father.    Social History:  reports that he has never smoked. He has never been exposed to tobacco smoke. He has never used smokeless tobacco. He reports that he does not currently use alcohol. He reports that he does not use drugs.    Current Outpatient Medications   Medication Instructions    acetaminophen (TYLENOL EXTRA STRENGTH) 1,000 mg, Nightly    calcium carbonate/vitamin D3 (VITAMIN D-3 ORAL) 125 mcg, 2 times daily    coQ10 (ubiquinol) 200 mg, Daily    famotidine (PEPCID) 20 mg, 2 times daily    ferrous sulfate (FEOSOL) Tab tablet 1 tablet, Nightly    fish oil/borage/flax/om3,6,9 1 (OMEGA 3-6-9 ORAL) 1,400 mg, Daily    imipramine (TOFRANIL) 25 mg, Nightly    Lactobac no.41/Bifidobact no.7 (PROBIOTIC-10 ORAL) 220 mg, 2 times daily    lutein 20 mg, Nightly    multivitamin-Ca-iron-minerals 27-0.4 mg Tab 2 times daily    naproxen sodium (ALEVE) 220 mg Cap Take 2 capsules every day by oral route.    pantoprazole (PROTONIX) 40 mg, Oral    potassium gluconate 595 mg (99 mg) Tab Nightly    PSYLLIUM HUSK, ASPARTAME, ORAL 1,000 mg, 2 times daily    senna (SENOKOT) 8.6 mg tablet Take 1 tablet every day by oral route in the evening.    simethicone (MYLICON) 80 mg, Nightly    tamsulosin (FLOMAX) 0.4 mg Cap 1 capsule, Daily       Patient is  "allergic to sulfa (sulfonamide antibiotics).     Patient Care Team:  Melanie Castelan APRN as PCP - General (Family Medicine)       Subjective:     Review of Systems    12 point review of systems conducted, negative except as stated in the history of present illness. See HPI for details.      Objective:     Vitals:    07/01/25 1424   BP: 116/66   Pulse: 75   Temp: 97.7 °F (36.5 °C)   TempSrc: Oral   SpO2: 99%   Weight: 89.5 kg (197 lb 6 oz)   Height: 5' 10" (1.778 m)   PainSc: 0-No pain         Physical Exam  Vitals and nursing note reviewed.   Constitutional:       Appearance: Normal appearance.   HENT:      Head: Normocephalic.   Eyes:      Conjunctiva/sclera: Conjunctivae normal.      Pupils: Pupils are equal, round, and reactive to light.   Cardiovascular:      Rate and Rhythm: Normal rate and regular rhythm.   Pulmonary:      Effort: Pulmonary effort is normal.      Breath sounds: Normal breath sounds.   Musculoskeletal:         General: Normal range of motion.      Cervical back: Normal range of motion and neck supple.   Feet:      Right foot:      Toenail Condition: Fungal disease present.     Left foot:      Toenail Condition: Fungal disease present.  Skin:     General: Skin is warm and dry.   Neurological:      General: No focal deficit present.      Mental Status: He is alert and oriented to person, place, and time.   Psychiatric:         Mood and Affect: Mood normal.         Behavior: Behavior normal.             No results found for this or any previous visit (from the past 24 hours).    Assessment:       ICD-10-CM ICD-9-CM   1. Tinea unguium  B35.1 110.1        Plan:     1. Tinea unguium  -     Ambulatory referral/consult to Podiatry        All questions were answered and the patient voiced understanding of the above issues.      Follow up if symptoms worsen or fail to improve. In addition to their scheduled follow up, the patient has also been instructed to follow up on as needed basis.            "

## 2025-08-14 RX ORDER — PANTOPRAZOLE SODIUM 40 MG/1
TABLET, DELAYED RELEASE ORAL
Qty: 30 TABLET | Refills: 5 | Status: SHIPPED | OUTPATIENT
Start: 2025-08-14